# Patient Record
Sex: FEMALE | Race: WHITE
[De-identification: names, ages, dates, MRNs, and addresses within clinical notes are randomized per-mention and may not be internally consistent; named-entity substitution may affect disease eponyms.]

---

## 2017-11-17 ENCOUNTER — HOSPITAL ENCOUNTER (OUTPATIENT)
Dept: HOSPITAL 47 - RADUSWWP | Age: 22
End: 2017-11-17
Payer: COMMERCIAL

## 2017-11-17 DIAGNOSIS — N83.202: Primary | ICD-10-CM

## 2017-11-17 PROCEDURE — 76856 US EXAM PELVIC COMPLETE: CPT

## 2017-11-18 NOTE — US
EXAMINATION TYPE: US pelvic complete

 

DATE OF EXAM: 11/17/2017

 

COMPARISON: US

 

CLINICAL HISTORY: Unspecified ovarian left side [id number]; intermittent left pelvic pain x 2 months

 

TECHNIQUE:  Transabdominal (TA)

 

Date of LMP:  10/23/2017

 

EXAM MEASUREMENTS:

 

Uterus:  7.8 x 4.8 x 2.0 cm

Endometrial Stripe: 0.5 cm

Right Ovary:  3.5 x 2.9 x 1.3 cm

Left Ovary:  2.5 x 1.7 x 1.6 cm

 

 

 

1. Uterus:  Anteverted  

2. Endometrium:  wnl

3. Right Ovary:  wnl

4. Left Ovary:  wnl, small follicles are seen

**Spectral, color and waveform doppler imaging shows good arterial and venous flow within the ovaries
; there is no evidence for ovarian torsion.

5. Bilateral Adnexa:  wnl

6. Posterior cul-de-sac:  wnl

7. At left lateral pelvic pain peristalsing bowel is noted.

 

 

IMPRESSION: Unremarkable transabdominal pelvic ultrasound. Peristalsing bowel is noted at the patient
's stated area of pain in the left lower quadrant.

## 2019-10-14 ENCOUNTER — HOSPITAL ENCOUNTER (OUTPATIENT)
Dept: HOSPITAL 47 - RADXRYALE | Age: 24
Discharge: HOME | End: 2019-10-14
Attending: PHYSICIAN ASSISTANT
Payer: COMMERCIAL

## 2019-10-14 DIAGNOSIS — M25.562: Primary | ICD-10-CM

## 2019-10-14 NOTE — XR
EXAMINATION TYPE: XR knee complete LT

 

DATE OF EXAM: 10/14/2019

 

COMPARISON: NONE

 

HISTORY: Pain

 

TECHNIQUE:

Four views are submitted.

 

FINDINGS:

Joint spaces are preserved.  Osseous structures are intact.  No acute fracture seen.  Small amount of
 fluid in the suprapatellar bursa.

 

IMPRESSION:

1. No acute fracture or dislocation.  There is a small amount of fluid in the suprapatellar bursa. If
 symptoms persist or concern for internal derangement correlate with MRI.